# Patient Record
Sex: MALE | Race: WHITE | URBAN - METROPOLITAN AREA
[De-identification: names, ages, dates, MRNs, and addresses within clinical notes are randomized per-mention and may not be internally consistent; named-entity substitution may affect disease eponyms.]

---

## 2017-10-16 PROBLEM — M54.16 LEFT LUMBAR RADICULOPATHY: Status: ACTIVE | Noted: 2017-10-16

## 2023-10-01 ENCOUNTER — HOSPITAL ENCOUNTER (EMERGENCY)
Age: 53
Discharge: HOME | End: 2023-10-01
Attending: EMERGENCY MEDICINE

## 2023-10-01 VITALS
WEIGHT: 170 LBS | BODY MASS INDEX: 27 KG/M2 | DIASTOLIC BLOOD PRESSURE: 90 MMHG | OXYGEN SATURATION: 95 % | HEART RATE: 69 BPM | RESPIRATION RATE: 18 BRPM | TEMPERATURE: 98 F | SYSTOLIC BLOOD PRESSURE: 132 MMHG

## 2023-10-01 DIAGNOSIS — I10 HYPERTENSION, UNSPECIFIED TYPE: Primary | ICD-10-CM

## 2023-10-01 PROCEDURE — 99283 EMERGENCY DEPT VISIT LOW MDM: CPT

## 2023-10-01 PROCEDURE — 99282 EMERGENCY DEPT VISIT SF MDM: CPT

## 2023-10-01 RX ORDER — LOSARTAN POTASSIUM 25 MG/1
25 TABLET ORAL DAILY
COMMUNITY

## 2023-10-02 NOTE — DISCHARGE INSTRUCTIONS
Please start taking losartan 50 mg during the daytime if your blood pressure is still elevated in the evening greater than 140/90 you can take another dose of 25 mg at nighttime. Please follow-up with your primary care physician within 24 to 48 hours for close reevaluation. He can also follow-up with Dr. Jaida Birght who is a cardiologist, to discuss blood pressure management with him.

## 2023-10-02 NOTE — ED INITIAL ASSESSMENT (HPI)
Pt referred to the ER for HTN today, pt reports having a history of cancer surgery on his brain (August 10th at [facility]) and was told if his bp was elevated to come to the ER. He's scheduled to have another surgery for stent placement on October 12th at [facility].

## 2023-10-24 ENCOUNTER — HOSPITAL ENCOUNTER (OUTPATIENT)
Dept: CV DIAGNOSTICS | Age: 53
Discharge: HOME | End: 2023-10-24
Attending: INTERNAL MEDICINE

## 2023-10-24 DIAGNOSIS — R94.31 NONSPECIFIC ABNORMAL ELECTROCARDIOGRAM (ECG) (EKG): ICD-10-CM

## 2023-10-24 DIAGNOSIS — Z82.49 FAMILY HISTORY OF ISCHEMIC HEART DISEASE: ICD-10-CM

## 2023-10-24 PROCEDURE — 93306 TTE W/DOPPLER COMPLETE: CPT | Performed by: INTERNAL MEDICINE

## 2024-07-23 ENCOUNTER — OFFICE VISIT (OUTPATIENT)
Dept: FAMILY MEDICINE CLINIC | Facility: CLINIC | Age: 54
End: 2024-07-23
Payer: COMMERCIAL

## 2024-07-23 ENCOUNTER — PATIENT OUTREACH (OUTPATIENT)
Dept: CASE MANAGEMENT | Age: 54
End: 2024-07-23

## 2024-07-23 VITALS
RESPIRATION RATE: 16 BRPM | OXYGEN SATURATION: 98 % | HEIGHT: 69 IN | TEMPERATURE: 98 F | HEART RATE: 57 BPM | WEIGHT: 176.13 LBS | BODY MASS INDEX: 26.09 KG/M2 | DIASTOLIC BLOOD PRESSURE: 72 MMHG | SYSTOLIC BLOOD PRESSURE: 100 MMHG

## 2024-07-23 DIAGNOSIS — M25.561 PATELLAR TENDERNESS, RIGHT: ICD-10-CM

## 2024-07-23 DIAGNOSIS — Z00.00 WELL ADULT EXAM: Primary | ICD-10-CM

## 2024-07-23 DIAGNOSIS — D32.9 MENINGIOMA (HCC): ICD-10-CM

## 2024-07-23 DIAGNOSIS — Z12.11 COLON CANCER SCREENING: ICD-10-CM

## 2024-07-23 DIAGNOSIS — I67.1 CEREBRAL ANEURYSM, NONRUPTURED (HCC): ICD-10-CM

## 2024-07-23 DIAGNOSIS — M76.51 PATELLAR TENDINITIS OF RIGHT KNEE: ICD-10-CM

## 2024-07-23 DIAGNOSIS — Z12.5 SCREENING PSA (PROSTATE SPECIFIC ANTIGEN): ICD-10-CM

## 2024-07-23 DIAGNOSIS — I10 BENIGN ESSENTIAL HTN: ICD-10-CM

## 2024-07-23 PROBLEM — R73.03 PREDIABETES: Status: ACTIVE | Noted: 2023-08-11

## 2024-07-23 PROCEDURE — 99386 PREV VISIT NEW AGE 40-64: CPT | Performed by: FAMILY MEDICINE

## 2024-07-23 RX ORDER — METOPROLOL SUCCINATE 25 MG/1
25 TABLET, EXTENDED RELEASE ORAL DAILY
COMMUNITY

## 2024-07-23 RX ORDER — ASPIRIN 325 MG
325 TABLET ORAL DAILY
COMMUNITY

## 2024-07-23 RX ORDER — AMLODIPINE BESYLATE 5 MG/1
5 TABLET ORAL NIGHTLY
COMMUNITY

## 2024-07-23 RX ORDER — AZELASTINE 1 MG/ML
2 SPRAY, METERED NASAL 2 TIMES DAILY
COMMUNITY
Start: 2024-05-20

## 2024-07-23 NOTE — PROCEDURES
The office order to remove patient from the asthma registry is Approved and finalized on July 23, 2024.

## 2024-07-23 NOTE — PROGRESS NOTES
Note to patient: The 21st Century Cures Act makes medical notes like these available to patients in the interest of transparency. However, be advised this is a medical document. It is intended as peer to peer communication. It is written in medical language and may contain abbreviations or verbiage that are unfamiliar. It may appear blunt or direct. Medical documents are intended to carry relevant information, facts as evident, and the clinical opinion of the practitioner.         Chief Complaint   Patient presents with    Well Adult     Patient denies having asthma   Est with pcp        HPI:    54-year-old male who has a past medical history of meningioma s/p resection, intracranial aneurysms, hypertension presenting today for establishment of care.  He underwent surgical excision of the meningioma at [facility] in August 2023.  Incidentally during the surgery it was noted that he has a left carotid artery aneurysm.  He was placed on lisinopril 5 mg.  Patient unfortunately developed a cough with lisinopril so he was switched to Cozaar 25 mg.  In September 2023 he underwent a cerebral angiogram.  He was found to have additional aneurysms including the ophthalmic aneurysm.  He had this treated with pipeline embolization of the left ophthalmic artery aneurysm and left paraclinoid aneurysm he was placed on Plavix and aspirin for 3 months. His next f/u with neurosurgery is in November when he will have MRI and angiogram.     Colonoscopy was done in 2020 with Dr. Dunham's not due for another colonoscopy.  Patient had a polyp and will need to undergo colonoscopy again.  He was placed on a 3-year plan and now is overdue.         HTN- he is on amlodipine 5mg, losartan 25mg, metoprolol 25 mg, - plan to be on high dose asa for one year since surgery. Patient follows cardiologist. Recently metoprolol was reduced to 25mg per day from 50mg.     Patient reports today that he does not have asthma. Reports that  when he gets a cold he gets a cough. No smoking history. He does have grass/spring time allergies.       Patient reports he has patellar tendon pain. He has normal gait. Onset couple months ago. Pain is getting worsee. He walks a lot at work and that creates more pain.      Smoking: none  Alcohol: none    Drugs: none   Sexual hx: 1 partner   STD hx: declined  No fmhx of colon or prostate cancer.   Tdap: declined tdap     Review of Systems   Constitutional: Negative for fever, chills and fatigue. No distress.  HENT: Negative for hearing loss, congestion, sore throat, neck pain   Eyes: Negative for pain and visual disturbance.   Respiratory: Negative for cough, chest tightness, shortness of breath and wheezing.    Cardiovascular: Negative for chest pain, palpitations and leg swelling.   Gastrointestinal: Negative for nausea, vomiting, abdominal pain, diarrhea, blood in stool and abdominal distention.   Genitourinary: Negative for dysuria, hematuria and difficulty urinating.   Musculoskeletal: Negative for myalgias, back pain, joint swelling, arthralgias and gait problem.   Skin: Negative for color change and rash.   Neurological: Negative for dizziness, syncope, weakness, numbness, tingling and headaches.       Patient Active Problem List   Diagnosis    Atopic rhinitis    Dyslipidemia    Lumbar radiculopathy    Displacement of lumbar intervertebral disc without myelopathy    Herpes simplex virus (HSV) infection    Benign essential HTN    Cerebral aneurysm, nonruptured (HCC)    Meningioma (HCC)    Prediabetes       Past Medical History:    Asthma (HCC)     Past Surgical History:   Procedure Laterality Date    Colonoscopy  11/20/2020    [facility]    Hernia surgery       Family History   Problem Relation Age of Onset    Hypertension Mother     Cancer Father         Lung Ca     Social History     Socioeconomic History    Marital status:    Tobacco Use    Smoking status: Never     Passive exposure: Never     Smokeless tobacco: Never   Vaping Use    Vaping status: Never Used   Substance and Sexual Activity    Alcohol use: No     Alcohol/week: 0.0 standard drinks of alcohol    Drug use: Never     Social Determinants of Health      [facility]    Housing Stability       Current Outpatient Medications   Medication Sig Dispense Refill    aspirin 325 MG Oral Tab Take 1 tablet (325 mg total) by mouth daily.      azelastine 0.1 % Nasal Solution 2 sprays by Nasal route 2 (two) times daily.      amLODIPine 5 MG Oral Tab Take 1 tablet (5 mg total) by mouth nightly.      metoprolol succinate ER 25 MG Oral Tablet 24 Hr Take 1 tablet (25 mg total) by mouth daily.      losartan 25 MG Oral Tab Take 1 tablet (25 mg total) by mouth daily.         Allergies  Allergies   Allergen Reactions    Lisinopril Coughing       Health Maintenance  Immunizations:  Immunization History   Administered Date(s) Administered    Influenza 10/04/2013    Pneumovax 23 10/04/2013   Deferred Date(s) Deferred    Influenza Vaccine Refused 11/19/2019         Physical Exam  /72   Pulse 57   Temp 98 °F (36.7 °C) (Temporal)   Resp 16   Ht 5' 9\" (1.753 m)   Wt 176 lb 2.4 oz (79.9 kg)   SpO2 98%   BMI 26.01 kg/m²   Constitutional: Oriented to person, place, and time. No distress.   HEENT:  Normocephalic and atraumatic. Hearing and tympanic membranes normal.  Nose normal. Oropharynx is clear and moist.   Eyes: Conjunctivae and EOM are normal. PERRLA. No scleral icterus.   Neck:  No mass and no thyromegaly present.   Cardiovascular: Normal rate, regular rhythm and intact distal pulses.  No murmur, rubs or gallops.   Pulmonary/Chest: Effort normal and breath sounds normal. No respiratory distress. No wheezes, rhonchi or rales  Abdominal: Soft. Bowel sounds are normal. Non tender, no masses, no organomegaly or hernias.  Musculoskeletal: focal tenderness without swelling at right patellar  tendon, FROM of the right knee joint with normal flexion/extension, no medial joint line tenderness. Gait normal.     Neurological: grossly normal  Psychiatric: Normal mood and affect.     A/P:    Encounter Diagnoses   Name Primary?    Well adult exam Yes    Screening PSA (prostate specific antigen)     Colon cancer screening     Patellar tenderness, right     Patellar tendinitis of right knee     Benign essential HTN     Meningioma (HCC)     Cerebral aneurysm, nonruptured (HCC)      1. Well adult exam  - -Discussed diet and exercise, counseled on vaccine and screening guidelines.   - CBC With Differential With Platelet; Future  - Comp Metabolic Panel (14); Future  - Lipid Panel; Future    2. Screening PSA (prostate specific antigen)  - PSA Screen; Future    3. Colon cancer screening  - Gastro Referral - In Network    4. Patellar tenderness, right  Referred to sports medicine.     5. Patellar tendinitis of right knee  - Ortho Referral - In Network    6. Benign essential HTN  BP shows good control with last BP of 100/72. Continue lifestyle changes, diet, exercise and weight loss.   Last K was 4.59 done on 10/27/2020.  Last Cr was 0.53 done on 10/27/2020.  BP Meds: amLODIPine Tabs - 5 MG; losartan Tabs - 25 MG; metoprolol succinate ER Tb24 - 25 MG   - amLODIPine 5 MG Oral Tab; Take 1 tablet (5 mg total) by mouth nightly.  - metoprolol succinate ER 25 MG Oral Tablet 24 Hr; Take 1 tablet (25 mg total) by mouth daily.  Pt does report fatigue, may be due to beta blocker.   He follows with cardiology.   Advised to see cardiology or me every 6 months at least for bP check. Patient to monitor bp at home daily.     7. Meningioma (HCC)  S/p resection   He will be on high dose asa for total of one year since surgery for aneurysms.   Follows with neurosurgery- next appt in November       8. Cerebral aneurysm, nonruptured (HCC)  S/p pipeline embolization of left ophthalmic and left paraclinoid aneurysm       Orders Placed This  Encounter   Procedures    CBC With Differential With Platelet    Comp Metabolic Panel (14)    Lipid Panel    PSA Screen       Meds & Refills for this Visit:  Requested Prescriptions      No prescriptions requested or ordered in this encounter       Imaging & Consults:  GASTRO - INTERNAL  ORTHOPEDIC - INTERNAL  [facility] PCP OR REGISTRY REMOVAL REQUEST      Return in about 6 months (around 1/23/2025) for blood pressure.    There are no Patient Instructions on file for this visit.    All questions were answered and the patient understands the plan.     Traci Vogt,         This note was prepared using Dragon Medical voice recognition dictation software. As a result errors may occur. When identified these errors have been corrected. While every attempt is made to correct errors during dictation discrepancies may still exist.      Note to patient: The 21st Century Cures Act makes medical notes like these available to patients in the interest of transparency. However, be advised this is a medical document. It is intended as peer to peer communication. It is written in medical language and may contain abbreviations or verbiage that are unfamiliar. It may appear blunt or direct. Medical documents are intended to carry relevant information, facts as evident, and the clinical opinion of the practitioner.

## 2024-07-26 ENCOUNTER — LAB ENCOUNTER (OUTPATIENT)
Dept: LAB | Age: 54
End: 2024-07-26
Attending: FAMILY MEDICINE
Payer: COMMERCIAL

## 2024-07-26 DIAGNOSIS — Z12.5 SCREENING PSA (PROSTATE SPECIFIC ANTIGEN): ICD-10-CM

## 2024-07-26 DIAGNOSIS — Z00.00 WELL ADULT EXAM: ICD-10-CM

## 2024-07-26 LAB
ALBUMIN SERPL-MCNC: 4.4 G/DL (ref 3.2–4.8)
ALBUMIN/GLOB SERPL: 1.8 {RATIO} (ref 1–2)
ALP LIVER SERPL-CCNC: 106 U/L
ALT SERPL-CCNC: 24 U/L
ANION GAP SERPL CALC-SCNC: 4 MMOL/L (ref 0–18)
AST SERPL-CCNC: 29 U/L (ref ?–34)
BASOPHILS # BLD AUTO: 0.04 X10(3) UL (ref 0–0.2)
BASOPHILS NFR BLD AUTO: 0.8 %
BILIRUB SERPL-MCNC: 0.8 MG/DL (ref 0.3–1.2)
BUN BLD-MCNC: 13 MG/DL (ref 9–23)
CALCIUM BLD-MCNC: 9.3 MG/DL (ref 8.7–10.4)
CHLORIDE SERPL-SCNC: 104 MMOL/L (ref 98–112)
CHOLEST SERPL-MCNC: 185 MG/DL (ref ?–200)
CO2 SERPL-SCNC: 31 MMOL/L (ref 21–32)
COMPLEXED PSA SERPL-MCNC: 0.2 NG/ML (ref ?–4)
CREAT BLD-MCNC: 0.74 MG/DL
EGFRCR SERPLBLD CKD-EPI 2021: 108 ML/MIN/1.73M2 (ref 60–?)
EOSINOPHIL # BLD AUTO: 0.14 X10(3) UL (ref 0–0.7)
EOSINOPHIL NFR BLD AUTO: 2.9 %
ERYTHROCYTE [DISTWIDTH] IN BLOOD BY AUTOMATED COUNT: 13.7 %
FASTING PATIENT LIPID ANSWER: YES
FASTING STATUS PATIENT QL REPORTED: YES
GLOBULIN PLAS-MCNC: 2.5 G/DL (ref 2–3.5)
GLUCOSE BLD-MCNC: 97 MG/DL (ref 70–99)
HCT VFR BLD AUTO: 43 %
HDLC SERPL-MCNC: 40 MG/DL (ref 40–59)
HGB BLD-MCNC: 14.3 G/DL
IMM GRANULOCYTES # BLD AUTO: 0.02 X10(3) UL (ref 0–1)
IMM GRANULOCYTES NFR BLD: 0.4 %
LDLC SERPL CALC-MCNC: 122 MG/DL (ref ?–100)
LYMPHOCYTES # BLD AUTO: 1.46 X10(3) UL (ref 1–4)
LYMPHOCYTES NFR BLD AUTO: 30.2 %
MCH RBC QN AUTO: 30.8 PG (ref 26–34)
MCHC RBC AUTO-ENTMCNC: 33.3 G/DL (ref 31–37)
MCV RBC AUTO: 92.7 FL
MONOCYTES # BLD AUTO: 0.4 X10(3) UL (ref 0.1–1)
MONOCYTES NFR BLD AUTO: 8.3 %
NEUTROPHILS # BLD AUTO: 2.77 X10 (3) UL (ref 1.5–7.7)
NEUTROPHILS # BLD AUTO: 2.77 X10(3) UL (ref 1.5–7.7)
NEUTROPHILS NFR BLD AUTO: 57.4 %
NONHDLC SERPL-MCNC: 145 MG/DL (ref ?–130)
OSMOLALITY SERPL CALC.SUM OF ELEC: 288 MOSM/KG (ref 275–295)
PLATELET # BLD AUTO: 220 10(3)UL (ref 150–450)
POTASSIUM SERPL-SCNC: 4 MMOL/L (ref 3.5–5.1)
PROT SERPL-MCNC: 6.9 G/DL (ref 5.7–8.2)
RBC # BLD AUTO: 4.64 X10(6)UL
SODIUM SERPL-SCNC: 139 MMOL/L (ref 136–145)
TRIGL SERPL-MCNC: 129 MG/DL (ref 30–149)
VLDLC SERPL CALC-MCNC: 23 MG/DL (ref 0–30)
WBC # BLD AUTO: 4.8 X10(3) UL (ref 4–11)

## 2024-07-26 PROCEDURE — 80053 COMPREHEN METABOLIC PANEL: CPT

## 2024-07-26 PROCEDURE — 80061 LIPID PANEL: CPT

## 2024-07-26 PROCEDURE — 85025 COMPLETE CBC W/AUTO DIFF WBC: CPT

## 2024-07-26 PROCEDURE — 36415 COLL VENOUS BLD VENIPUNCTURE: CPT

## 2024-07-31 ENCOUNTER — TELEPHONE (OUTPATIENT)
Dept: FAMILY MEDICINE CLINIC | Facility: CLINIC | Age: 54
End: 2024-07-31

## 2024-07-31 DIAGNOSIS — Z12.11 COLON CANCER SCREENING: Primary | ICD-10-CM

## 2024-07-31 NOTE — TELEPHONE ENCOUNTER
Spouse requesting another GI referral, will like to see new specialist. Will like to see GI in the [place] area, looking for PCP recommendation

## 2024-08-20 ENCOUNTER — TELEPHONE (OUTPATIENT)
Dept: FAMILY MEDICINE CLINIC | Facility: CLINIC | Age: 54
End: 2024-08-20

## 2024-08-20 ENCOUNTER — TELEPHONE (OUTPATIENT)
Dept: ORTHOPEDICS CLINIC | Facility: CLINIC | Age: 54
End: 2024-08-20

## 2024-08-20 DIAGNOSIS — M25.561 RIGHT KNEE PAIN, UNSPECIFIED CHRONICITY: Primary | ICD-10-CM

## 2024-08-20 NOTE — TELEPHONE ENCOUNTER
Spoke to patient's wife, ok per VRI form. Reviewed results and recommendations. Advised wife to keep upcoming ortho appointment and check to see if there are any cancellations. Wife verbalizes understanding.    Future Appointments   Date Time Provider Department Center   10/7/2024  9:00 AM Robb Mendez MD [facility] [address]   10/7/2024 10:30 AM Raheem Young,  [facility]

## 2024-08-20 NOTE — TELEPHONE ENCOUNTER
Patient's wife is calling to request a call back for lab results.  Also patient states he discussed R knee and would like a referral to see Orthopedic.    Please call with test results.Ph. [phone]

## 2024-08-20 NOTE — TELEPHONE ENCOUNTER
Patient is scheduled for right knee pain. Please advise if imaging is needed.  Future Appointments   Date Time Provider Department Center   10/7/2024  9:00 AM Robb Mendez MD [facility] [address]   10/7/2024 10:30 AM Raheem Young, DO [facility] SUB GI

## 2024-10-14 ENCOUNTER — HOSPITAL ENCOUNTER (OUTPATIENT)
Dept: GENERAL RADIOLOGY | Age: 54
Discharge: HOME | End: 2024-10-14
Attending: ORTHOPAEDIC SURGERY
Payer: COMMERCIAL

## 2024-10-14 ENCOUNTER — OFFICE VISIT (OUTPATIENT)
Facility: CLINIC | Age: 54
End: 2024-10-14
Payer: COMMERCIAL

## 2024-10-14 VITALS — WEIGHT: 175.81 LBS | HEIGHT: 69 IN | BODY MASS INDEX: 26.04 KG/M2

## 2024-10-14 DIAGNOSIS — M25.561 RIGHT KNEE PAIN, UNSPECIFIED CHRONICITY: Primary | ICD-10-CM

## 2024-10-14 DIAGNOSIS — M25.561 RIGHT KNEE PAIN, UNSPECIFIED CHRONICITY: ICD-10-CM

## 2024-10-14 PROCEDURE — 99203 OFFICE O/P NEW LOW 30 MIN: CPT | Performed by: ORTHOPAEDIC SURGERY

## 2024-10-14 PROCEDURE — 73564 X-RAY EXAM KNEE 4 OR MORE: CPT | Performed by: ORTHOPAEDIC SURGERY

## 2024-10-14 NOTE — H&P
[facility] New Patient Note    CC:   Chief Complaint   Patient presents with    Knee Pain     Right knee pain        HPI: This 54 year old male presents today with complaints of right knee pain.  He reports this has been going on for about 5 months.  Symptoms began atraumatically.  Pain is on the medial aspect of the knee joint, points anteromedial.  He denies any injury, no prior knee pain.  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  He does not take any medications for it.  He did do physical therapy and this did not help.  He notes that more recently he has applied a \"natural\" over-the-counter patch over the knee, and has gotten 50% better over the past few days.  No radiation of pain.    Past Medical History:    Asthma (HCC)     Past Surgical History:   Procedure Laterality Date    Colonoscopy  11/20/2020    [facility]ll[facility]    Hernia surgery       Current Outpatient Medications   Medication Sig Dispense Refill    aspirin 325 MG Oral Tab Take 1 tablet (325 mg total) by mouth daily.      azelastine 0.1 % Nasal Solution 2 sprays by Nasal route 2 (two) times daily.      amLODIPine 5 MG Oral Tab Take 1 tablet (5 mg total) by mouth nightly.      metoprolol succinate ER 25 MG Oral Tablet 24 Hr Take 1 tablet (25 mg total) by mouth daily.      losartan 25 MG Oral Tab Take 1 tablet (25 mg total) by mouth daily.      PEG 3350-KCl-Na Bicarb-NaCl 420 g Oral Recon Soln Take as directed by physician (Patient not taking: Reported on 10/14/2024) 4000 mL 0     Allergies[1]  Family History   Problem Relation Age of Onset    Hypertension Mother     Cancer Father         Lung Ca     Social History     Occupational History    Not on file   Tobacco Use    Smoking status: Never     Passive exposure: Never    Smokeless tobacco: Never    Tobacco comments:     Updated 10/14/24   Vaping Use    Vaping status: Never Used   Substance and  Sexual Activity    Alcohol use: No     Alcohol/week: 0.0 standard drinks of alcohol    Drug use: Never    Sexual activity: Not on file        ROS:  Detailed system review obtained and negative except as mentioned above      Physical Exam:    Ht 5' 9\" (1.753 m)   Wt 175 lb 12.8 oz (79.7 kg)   BMI 25.96 kg/m²   Constitutional: Awake, alert, no distress.  Present with spouse  Psychological: Appropriate affect.  Respiratory: Unlabored breathing.  Right lower extremity:  Inspection: skin intact, no lesions, no effusion about the knee  Palpation: Tender to palpation about the anteromedial joint line, reproduces pain complaint  Range of motion: Full extension, flexion past 120  Knee stable to varus and valgus stress throughout range of motion, negative Suzie's  Neuromuscular: 5 out of 5 quad strength, sensation intact  Vascular: Warm well-perfused      Imaging: Weightbearing x-rays of the right knee personally viewed, independently interpreted and radiology report read.  Mild degenerative changes, possible squaring of the medial femoral condyle and possible osteophytic lipping, joint space largely maintained.  Lateral view with probable medial joint space narrowing.      Assessment/Plan:  Diagnoses and all orders for this visit:    Right knee pain, unspecified chronicity      Assessment: 54-year-old male with subacute atraumatic right knee pain, mild osteoarthritis    Plan: We discussed potential etiologies of his symptoms, including mild osteoarthritis, soft tissue/internal derangement.  Discussed treatment options going forward.  He has done physical therapy, this did not provide relief.  He is using an over-the-counter patch that he states has helped tremendously and he would like to continue this for now.  I did  on anti-inflammatory use, but he does have some medical issues that may contraindicate this.  He is additionally taking aspirin at this time.  Discussed possibility of steroid injection, he would  like to hold off and see how his symptoms progress with these over-the-counter patches, and if he does want to proceed with injection he can contact us.  I did provide contact info/card for Dr. Eli given that he is a mildly arthritic case at worst, surgery would not be considered at this time.    Robb Mendez MD, FAAOS  [facility]  Phone [phone]  Fax [phone]         [1]   Allergies  Allergen Reactions    Lisinopril Coughing

## 2024-10-15 ENCOUNTER — OFFICE VISIT (OUTPATIENT)
Dept: FAMILY MEDICINE CLINIC | Facility: CLINIC | Age: 54
End: 2024-10-15
Payer: COMMERCIAL

## 2024-10-15 VITALS
OXYGEN SATURATION: 92 % | SYSTOLIC BLOOD PRESSURE: 98 MMHG | DIASTOLIC BLOOD PRESSURE: 60 MMHG | RESPIRATION RATE: 16 BRPM | HEART RATE: 64 BPM | WEIGHT: 179 LBS | BODY MASS INDEX: 26.51 KG/M2 | HEIGHT: 69 IN | TEMPERATURE: 97 F

## 2024-10-15 DIAGNOSIS — Z28.21 REFUSED INFLUENZA VACCINE: ICD-10-CM

## 2024-10-15 DIAGNOSIS — Z12.11 COLON CANCER SCREENING: ICD-10-CM

## 2024-10-15 DIAGNOSIS — Z01.818 PREOP EXAMINATION: Primary | ICD-10-CM

## 2024-10-15 LAB
ATRIAL RATE: 58 BPM
P AXIS: 13 DEGREES
P-R INTERVAL: 162 MS
Q-T INTERVAL: 422 MS
QRS DURATION: 80 MS
QTC CALCULATION (BEZET): 414 MS
R AXIS: -21 DEGREES
T AXIS: 8 DEGREES
VENTRICULAR RATE: 58 BPM

## 2024-10-15 PROCEDURE — 99213 OFFICE O/P EST LOW 20 MIN: CPT | Performed by: FAMILY MEDICINE

## 2024-10-15 PROCEDURE — 93000 ELECTROCARDIOGRAM COMPLETE: CPT | Performed by: FAMILY MEDICINE

## 2024-10-15 NOTE — PROGRESS NOTES
Sukhdeep Morris is a 54 year old male.   Chief Complaint   Patient presents with    Pre-Op Exam     ANGIO CAROTID CEREBRAL KAVON S&I  10/22/2024 at [facility]  Dr Banuelos  Did not receive pre op form      HPI:   Sukhedep presents for preoperative evaluation and clearance for Diagnostic Cerebral Angiogram on 10/22/2024 with Dr. Banuelos.    Per preop note from surgeons office said to continue asa 325mg prior to surgery.   PMHx: meningioma s/p resection, intracranial aneurysms, hypertension  Patient is a nonsmoker.   Denies any cardiac symptoms.   No h/o of ischemic heart disease/CHF  No hx of CVD, diabetes, blood thinner use  No personal or fmhx of hypercoagulability.   No hx of adverse reaction to anesthesia.   No hx of anemia.   No hx of DVT/PE  Patient instructed to avoid/hold NSAIDs,  vitamins & supplements 7 days prior to surgery.         Current Outpatient Medications   Medication Sig Dispense Refill    PEG 3350-KCl-Na Bicarb-NaCl 420 g Oral Recon Soln Take as directed by physician 4000 mL 0    aspirin 325 MG Oral Tab Take 1 tablet (325 mg total) by mouth daily.      azelastine 0.1 % Nasal Solution 2 sprays by Nasal route 2 (two) times daily.      amLODIPine 5 MG Oral Tab Take 1 tablet (5 mg total) by mouth nightly.      metoprolol succinate ER 25 MG Oral Tablet 24 Hr Take 1 tablet (25 mg total) by mouth daily.      losartan 25 MG Oral Tab Take 1 tablet (25 mg total) by mouth daily.        Allergies[1]   Past Medical History:    Asthma (HCC)      Past Surgical History:   Procedure Laterality Date    Colonoscopy  11/20/2020    [place] Dr. Mcclellan    Hernia surgery        Family History   Problem Relation Age of Onset    Hypertension Mother     Cancer Father         Lung Ca      Social History     Socioeconomic History    Marital status:    Tobacco Use    Smoking status: Never     Passive exposure: Never    Smokeless tobacco: Never    Tobacco comments:     Updated 10/14/24   Vaping Use    Vaping status: Never  Used   Substance and Sexual Activity    Alcohol use: No     Alcohol/week: 0.0 standard drinks of alcohol    Drug use: Never        REVIEW OF SYSTEMS:   GENERAL HEALTH: feels well otherwise, denies fever  EYES: no visual complaints or deficits  HEENT: denies nasal congestion, sinus pain or sore throat; hearing loss negative  RESPIRATORY: denies shortness of breath, wheezing or cough  CARDIOVASCULAR: denies chest pain or HERNANDEZ; no palpitations  GI: denies nausea, vomiting, constipation, diarrhea; no rectal bleeding; no heartburn  GENITAL/: no dysuria, urgency or frequency  MUSCULOSKELETAL: no joint complaints upper or lower extremities  NEURO: no sensory or motor complaint  HEMATOLOGY: denies h/o anemia  Immunization History   Administered Date(s) Administered    Influenza 10/04/2013    Pneumovax 23 10/04/2013   Deferred Date(s) Deferred    Influenza Vaccine Refused 11/19/2019    Influenza Vaccine, trivalent (IIV3), PF 0.5mL (61644) 10/15/2024       EXAM:   BP 98/60   Pulse 64   Temp 97.3 °F (36.3 °C) (Temporal)   Resp 16   Ht 5' 9\" (1.753 m)   Wt 179 lb (81.2 kg)   SpO2 92%   BMI 26.43 kg/m²   GENERAL: well developed, well nourished, in no apparent distress  HEENT: normocephalic; normal nose, pharynx and TM's  EYES: PERRLA  NECK: supple, no thyromegaly  RESPIRATORY: clear to percussion and auscultation  CARDIOVASCULAR: S1, S2 normal, RRR; no S3, no S4; no click; murmur negative  ABDOMEN: normal active BS+, soft, nondistended; no HSM; no masses; no bruits; no masses; nontender  EXTREMITIES: no cyanosis, clubbing or edema, peripheral pulses intact  NEUROLOGIC: grossly normal, normal gait   PSYCHIATRIC: alert and oriented x 3; affect appropriate    EKG    Rate, intervals and axes as noted on EKG Report.  Rate: 58  Rhythm: Sinus Rhythm  Reading: NSR no acute st or t wave changes.       ASSESSMENT & PLAN:   Preoperative Physical Exam:     Sukhdeep was examined today and is an acceptable risk to proceed with surgery  following review of his labs.   EKG completed in the office today.    Will obtain and evaluate preoperative labs   Risks and benefits of surgery explained to patient.  Including;  Deep venous thrombosis, pulmonary embolus, bleeding, myocardial infarction, failure to relieve pain, wound infection, wound dehiscence, anesthesia complications, arrhythmia's etc.    ADDENDUM 10/19/24:  LABS REVIEWED. ALL LABS ARE STABLE/NORMAL. PATIENT IS ACCEPTABLE RISK TO PROCEED WITH PROCEDURE.     Traci Vogt,         This note was prepared using Dragon Medical voice recognition dictation software. As a result errors may occur. When identified these errors have been corrected. While every attempt is made to correct errors during dictation discrepancies may still exist.      Note to patient: The 21st Century Cures Act makes medical notes like these available to patients in the interest of transparency. However, be advised this is a medical document. It is intended as peer to peer communication. It is written in medical language and may contain abbreviations or verbiage that are unfamiliar. It may appear blunt or direct. Medical documents are intended to carry relevant information, facts as evident, and the clinical opinion of the practitioner.            id#[id number]         [1]   Allergies  Allergen Reactions    Lisinopril Coughing

## 2024-10-17 ENCOUNTER — TELEPHONE (OUTPATIENT)
Dept: FAMILY MEDICINE CLINIC | Facility: CLINIC | Age: 54
End: 2024-10-17

## 2024-10-17 NOTE — TELEPHONE ENCOUNTER
EKG done on 10/15/2024  Patient did not complete labs ordered on 10/15/2024.  Called Quinn to confirm which labs are needed.  No letter from surgeon's office with pre-op requirements.    Once labs are confirmed, will notify patient to complete labs.

## 2024-10-17 NOTE — TELEPHONE ENCOUNTER
Quinn from [facility] called and said patient is having surgery on 10/22/24 and they are still in need of lab results and the EKG tracing.  She would like it faxed to [phone].

## 2024-10-18 ENCOUNTER — LAB ENCOUNTER (OUTPATIENT)
Dept: LAB | Facility: HOSPITAL | Age: 54
End: 2024-10-18
Attending: FAMILY MEDICINE
Payer: COMMERCIAL

## 2024-10-18 DIAGNOSIS — Z01.818 PREOP EXAMINATION: ICD-10-CM

## 2024-10-18 LAB
ALBUMIN SERPL-MCNC: 4.3 G/DL (ref 3.2–4.8)
ALBUMIN/GLOB SERPL: 1.8 {RATIO} (ref 1–2)
ALP LIVER SERPL-CCNC: 111 U/L
ALT SERPL-CCNC: 14 U/L
ANION GAP SERPL CALC-SCNC: 9 MMOL/L (ref 0–18)
APTT PPP: 29.9 SECONDS (ref 23–36)
AST SERPL-CCNC: 17 U/L (ref ?–34)
BASOPHILS # BLD AUTO: 0.02 X10(3) UL (ref 0–0.2)
BASOPHILS NFR BLD AUTO: 0.4 %
BILIRUB SERPL-MCNC: 0.6 MG/DL (ref 0.3–1.2)
BUN BLD-MCNC: 19 MG/DL (ref 9–23)
CALCIUM BLD-MCNC: 9.4 MG/DL (ref 8.7–10.4)
CHLORIDE SERPL-SCNC: 104 MMOL/L (ref 98–112)
CO2 SERPL-SCNC: 27 MMOL/L (ref 21–32)
CREAT BLD-MCNC: 0.63 MG/DL
EGFRCR SERPLBLD CKD-EPI 2021: 113 ML/MIN/1.73M2 (ref 60–?)
EOSINOPHIL # BLD AUTO: 0.2 X10(3) UL (ref 0–0.7)
EOSINOPHIL NFR BLD AUTO: 3.8 %
ERYTHROCYTE [DISTWIDTH] IN BLOOD BY AUTOMATED COUNT: 13.2 %
FASTING STATUS PATIENT QL REPORTED: NO
GLOBULIN PLAS-MCNC: 2.4 G/DL (ref 2–3.5)
GLUCOSE BLD-MCNC: 95 MG/DL (ref 70–99)
HCT VFR BLD AUTO: 39.7 %
HGB BLD-MCNC: 13.6 G/DL
IMM GRANULOCYTES # BLD AUTO: 0 X10(3) UL (ref 0–1)
IMM GRANULOCYTES NFR BLD: 0 %
INR BLD: 0.94 (ref 0.8–1.2)
LYMPHOCYTES # BLD AUTO: 1.58 X10(3) UL (ref 1–4)
LYMPHOCYTES NFR BLD AUTO: 29.7 %
MCH RBC QN AUTO: 31.3 PG (ref 26–34)
MCHC RBC AUTO-ENTMCNC: 34.3 G/DL (ref 31–37)
MCV RBC AUTO: 91.3 FL
MONOCYTES # BLD AUTO: 0.54 X10(3) UL (ref 0.1–1)
MONOCYTES NFR BLD AUTO: 10.2 %
NEUTROPHILS # BLD AUTO: 2.98 X10 (3) UL (ref 1.5–7.7)
NEUTROPHILS # BLD AUTO: 2.98 X10(3) UL (ref 1.5–7.7)
NEUTROPHILS NFR BLD AUTO: 55.9 %
OSMOLALITY SERPL CALC.SUM OF ELEC: 292 MOSM/KG (ref 275–295)
PLATELET # BLD AUTO: 228 10(3)UL (ref 150–450)
POTASSIUM SERPL-SCNC: 4.1 MMOL/L (ref 3.5–5.1)
PROT SERPL-MCNC: 6.7 G/DL (ref 5.7–8.2)
PROTHROMBIN TIME: 12.7 SECONDS (ref 11.6–14.8)
RBC # BLD AUTO: 4.35 X10(6)UL
SODIUM SERPL-SCNC: 140 MMOL/L (ref 136–145)
TSI SER-ACNC: 0.58 MIU/ML (ref 0.55–4.78)
WBC # BLD AUTO: 5.3 X10(3) UL (ref 4–11)

## 2024-10-18 PROCEDURE — 85025 COMPLETE CBC W/AUTO DIFF WBC: CPT

## 2024-10-18 PROCEDURE — 85730 THROMBOPLASTIN TIME PARTIAL: CPT

## 2024-10-18 PROCEDURE — 80053 COMPREHEN METABOLIC PANEL: CPT

## 2024-10-18 PROCEDURE — 85610 PROTHROMBIN TIME: CPT

## 2024-10-18 PROCEDURE — 84443 ASSAY THYROID STIM HORMONE: CPT

## 2024-10-18 PROCEDURE — 36415 COLL VENOUS BLD VENIPUNCTURE: CPT

## 2024-10-18 NOTE — TELEPHONE ENCOUNTER
Called [facility] to confirm lab orders. No answer.    Called patient. Spoke to patient and wife. Patient did lab work at a [place] facility. Labs drawn are unknown. Patient on his way to [facility] appointment. Will complete lab work after appointment.     Patient requesting a call back or MCM after provider reviews labs and documents faxed to [facility].

## 2024-10-21 NOTE — TELEPHONE ENCOUNTER
Faxed office visit notes from 10/15/24, Labs from 10/18/24, and EKG from 10/15/24 to Dr. Banuelos at [facility] at [phone]. Fax confirmed

## 2025-04-05 ENCOUNTER — OFFICE VISIT (OUTPATIENT)
Dept: FAMILY MEDICINE CLINIC | Facility: CLINIC | Age: 55
End: 2025-04-05
Payer: COMMERCIAL

## 2025-04-05 VITALS
TEMPERATURE: 99 F | HEART RATE: 75 BPM | WEIGHT: 175.19 LBS | OXYGEN SATURATION: 96 % | DIASTOLIC BLOOD PRESSURE: 80 MMHG | BODY MASS INDEX: 26 KG/M2 | SYSTOLIC BLOOD PRESSURE: 112 MMHG | RESPIRATION RATE: 16 BRPM

## 2025-04-05 DIAGNOSIS — B34.9 ACUTE VIRAL SYNDROME: Primary | ICD-10-CM

## 2025-04-05 PROCEDURE — 99213 OFFICE O/P EST LOW 20 MIN: CPT | Performed by: PHYSICIAN ASSISTANT

## 2025-04-05 PROCEDURE — 87637 SARSCOV2&INF A&B&RSV AMP PRB: CPT | Performed by: PHYSICIAN ASSISTANT

## 2025-04-05 RX ORDER — BENZONATATE 200 MG/1
200 CAPSULE ORAL 3 TIMES DAILY PRN
Qty: 21 CAPSULE | Refills: 0 | Status: SHIPPED | OUTPATIENT
Start: 2025-04-05 | End: 2025-04-12

## 2025-04-05 RX ORDER — OSELTAMIVIR PHOSPHATE 75 MG/1
75 CAPSULE ORAL 2 TIMES DAILY
Qty: 10 CAPSULE | Refills: 0 | Status: SHIPPED | OUTPATIENT
Start: 2025-04-05 | End: 2025-04-10

## 2025-04-05 NOTE — PATIENT INSTRUCTIONS
Tamiflu  OTC symptoms support  Fluids/rest  Follow up with PCP   If worsening symptoms seek treatment

## 2025-04-05 NOTE — PROGRESS NOTES
CHIEF COMPLAINT:     Chief Complaint   Patient presents with    Cough     Dry cough, temp was not check, s/s for 3 days.  OTC meds taken       HPI:   Sukhdeep Morris is a 55 year old male who presents with complaints of feeling sick for 3 days.  Symptoms include fever, nasal congestion, nasal drainage, and cough.  The patient did not take his temperature with a thermometer.  He denies taking any antipyretics today.  The patient denies ear pain, sore throat, CP, SOB, wheezing, abdominal pain, vomiting, diarrhea, rash, and urinary symptoms.  The patient reports his mother is sick with similar symptoms.  He denies any history of COVID.  He denies receiving a flu shot this year.     Current Outpatient Medications   Medication Sig Dispense Refill    oseltamivir (TAMIFLU) 75 MG Oral Cap Take 1 capsule (75 mg total) by mouth 2 (two) times daily for 5 days. 10 capsule 0    benzonatate 200 MG Oral Cap Take 1 capsule (200 mg total) by mouth 3 (three) times daily as needed for cough. 21 capsule 0    PEG 3350-KCl-Na Bicarb-NaCl 420 g Oral Recon Soln Take as directed by physician 4000 mL 0    aspirin 325 MG Oral Tab Take 1 tablet (325 mg total) by mouth daily.      azelastine 0.1 % Nasal Solution 2 sprays by Nasal route 2 (two) times daily.      amLODIPine 5 MG Oral Tab Take 1 tablet (5 mg total) by mouth nightly.      metoprolol succinate ER 25 MG Oral Tablet 24 Hr Take 1 tablet (25 mg total) by mouth daily.      losartan 25 MG Oral Tab Take 1 tablet (25 mg total) by mouth daily.        Past Medical History:    Asthma (HCC)      Social History:  Social History     Socioeconomic History    Marital status:    Tobacco Use    Smoking status: Never     Passive exposure: Never    Smokeless tobacco: Never    Tobacco comments:     Updated 10/14/24   Vaping Use    Vaping status: Never Used   Substance and Sexual Activity    Alcohol use: No     Alcohol/week: 0.0 standard drinks of alcohol    Drug use: Never     Social Drivers of  Health      [facility]    Housing Stability        REVIEW OF SYSTEMS:   GENERAL: See HPI  SKIN: Denies rashes, skin wounds or ulcers.  EYES: Denies blurred vision or double vision  HENT: See HPI  CHEST: Denies chest pain, or palpitations  LUNGS: See HPI  GI: Denies abdominal pain, N/V/C/D.   MUSCULOSKELETAL: no arthralgia or swollen joints  LYMPH:  Denies lymphadenopathy  NEURO: Denies headaches or lightheadedness      EXAM:   /80   Pulse 75   Temp 99.3 °F (37.4 °C) (Oral)   Resp 16   Wt 175 lb 3.2 oz (79.5 kg)   SpO2 96%   BMI 25.87 kg/m²   GENERAL: well developed, well nourished,in no apparent distress, cooperative   SKIN: no rashes, nosuspicious lesions, no abnormal pigmentation  HEAD: atraumatic, normocephalic  EYES: EOM intact, PERRL.  Conjunctiva normal.  Cornea clear.  Lid margins normal.  No active drainage.  EARS: Right TM normal, no bulging, no retraction, no fluid, bony landmarks normal.  Left TM normal, no bulging, no retraction, no fluid, bony landmarks normal.    NOSE: nostrils patent, + discharge, nasal mucosa pink and not inflamed.  No sinus tenderness.  THROAT: oral mucosa pink, moist and intact. No visible dental caries. Posterior pharynx without erythema or exudates.  NECK: supple, non-tender.  LUNGS: clear to auscultation bilaterally, no wheezes or rhonchi. Breathing is non labored.  CARDIO: RRR without murmur  GI: No visible scars, or masses. BS's present x4. No palpable masses or hepatosplenomegaly.  Non tender.  No guarding or rebound tenderness  EXTREMITIES: no cyanosis, clubbing or edema.  Homans NEG.  Dorsalis Pedis 2+.  LYMPH:  No lymphadenopathy.    NEURO: A&Ox3.  CN II-XII intact.  No focal deficits.  Coordination and Gait normal.  Kernig and Brudzinski's are negative.      ASSESSMENT AND PLAN:     ASSESSMENT:  Encounter Diagnosis   Name Primary?    Acute viral syndrome Yes       PLAN:    Patient Instructions    Tamiflu  OTC symptoms support  Fluids/rest  Follow up with PCP   If worsening symptoms seek treatment

## 2025-04-07 LAB
FLUAV + FLUBV RNA SPEC NAA+PROBE: DETECTED
FLUAV + FLUBV RNA SPEC NAA+PROBE: NOT DETECTED
RSV RNA SPEC NAA+PROBE: NOT DETECTED
SARS-COV-2 RNA RESP QL NAA+PROBE: NOT DETECTED

## 2025-05-01 ENCOUNTER — OFFICE VISIT (OUTPATIENT)
Dept: FAMILY MEDICINE CLINIC | Facility: CLINIC | Age: 55
End: 2025-05-01
Payer: COMMERCIAL

## 2025-05-01 ENCOUNTER — TELEPHONE (OUTPATIENT)
Dept: FAMILY MEDICINE CLINIC | Facility: CLINIC | Age: 55
End: 2025-05-01

## 2025-05-01 VITALS
BODY MASS INDEX: 26.22 KG/M2 | HEART RATE: 66 BPM | HEIGHT: 69 IN | WEIGHT: 177 LBS | RESPIRATION RATE: 16 BRPM | DIASTOLIC BLOOD PRESSURE: 72 MMHG | OXYGEN SATURATION: 97 % | SYSTOLIC BLOOD PRESSURE: 102 MMHG | TEMPERATURE: 97 F

## 2025-05-01 DIAGNOSIS — J40 BRONCHITIS: Primary | ICD-10-CM

## 2025-05-01 DIAGNOSIS — Z91.09 ENVIRONMENTAL ALLERGIES: ICD-10-CM

## 2025-05-01 PROCEDURE — 99213 OFFICE O/P EST LOW 20 MIN: CPT | Performed by: FAMILY MEDICINE

## 2025-05-01 RX ORDER — FLUTICASONE PROPIONATE 50 MCG
2 SPRAY, SUSPENSION (ML) NASAL DAILY
Qty: 11.1 ML | Refills: 0 | Status: SHIPPED | OUTPATIENT
Start: 2025-05-01 | End: 2025-05-31

## 2025-05-01 RX ORDER — MONTELUKAST SODIUM 10 MG/1
10 TABLET ORAL DAILY
Qty: 30 TABLET | Refills: 0 | Status: SHIPPED | OUTPATIENT
Start: 2025-05-01 | End: 2025-05-31

## 2025-05-01 RX ORDER — METHYLPREDNISOLONE 4 MG/1
TABLET ORAL
Qty: 21 EACH | Refills: 0 | Status: SHIPPED | OUTPATIENT
Start: 2025-05-01

## 2025-05-01 NOTE — TELEPHONE ENCOUNTER
Spouse calling, patient went to [facility] on 4-5 for cough. Spouse states patient is still sick and had possible fever. Patient took OTC Tylenol, spouse concerned due to patient's history of cerebral aneurysm. Please advise

## 2025-05-01 NOTE — TELEPHONE ENCOUNTER
Future Appointments   Date Time Provider Department Center   5/1/2025  1:30 PM Traci Vogt,  [facility] [address] [facility] [address]

## 2025-05-01 NOTE — PROGRESS NOTES
CHIEF COMPLAINT:     Chief Complaint   Patient presents with    Cough     Dry cough x 6 wks // had chills last night and fever... did not take temp        HPI:   Sukhdeep Morris is a 55 year old male who presents for upper respiratory symptoms for  6 weeks.   Pt has hx of meningioma s/p surgery.   Hx brian cerebral aneurysm s/p embolization.   Pt was seen on 4/5/25 for 3 days of URI at . He had fever, congestion, cough, nasal driange.   He took tamiflu, tested positive for influenza A.     Says tamiflu caused him to have high BP.   Today he reports dry cough since getting sick a month ago   Last night he felt chills. But he overall feels better.   Cough is worse at night. .   No hx of asthma but was told he might have asthma. He used albuterol before.   He never smoked.   Denies chest pain, sob, ear pain  Used ot have GERD. He stopped soda and GERD resolved.   He does have seasonal allergies. He did not try allergy meds yet.   Reports some itchy eyes.   No SOB, sputum production, fever    Current Medications[1]   Past Medical History[2]   Past Surgical History[3]   Family History[4]   Short Social Hx on File[5]      REVIEW OF SYSTEMS:   GENERAL: normal appetite  SKIN: no rashes or abnormal skin lesions  HEENT: See HPI  LUNGS: See HPI  CARDIOVASCULAR: denies chest pain or palpitations   GI: denies N/V/C or abdominal pain      EXAM:   /72   Pulse 66   Temp 97.2 °F (36.2 °C) (Temporal)   Resp 16   Ht 5' 9\" (1.753 m)   Wt 177 lb (80.3 kg)   SpO2 97%   BMI 26.14 kg/m²   GENERAL: well developed, well nourished,in no apparent distress  SKIN: no rashes,no suspicious lesions  HEAD: atraumatic, normocephalic.    EYES: conjunctiva clear, EOM intact  EARS: TM's normal, no bulging, noretraction,no fluid  NOSE: Nostrils patent, no nasal discharge, nasal mucosa normal    THROAT: Oral mucosa pink, moist. Posterior pharynx is not erythematous. no exudates. Tonsils normal   NECK: Supple, non-tender  LUNGS: clear to  auscultation bilaterally, no wheezes or rhonchi. Breathing is non labored.  CARDIO: RRR without murmur  EXTREMITIES: no cyanosis, clubbing or edema  LYMPH:  no cervical lymphadenopathy.      Lab review  No results found for this or any previous visit (from the past 24 hours).    ASSESSMENT AND PLAN:   Sukhdeep Morris is a 55 year old male who presents with upper respiratory symptoms that are consistent with    ASSESSMENT:   Encounter Diagnoses   Name Primary?    Bronchitis Yes    Environmental allergies        PLAN: Meds as below.  Comfort care as described in Patient Instructions    Patient with dry cough. Patient is afebrile, no respiratory distress, lungs clear to auscultation bilaterally likely bronchitis. Started on singulair and flonase for allergies. Also take medrol dospak for possible bronchitis  F/u sooner if cough worsens or other alarming symptoms develop such as productive cough, fevers, chills, hematemesis, chest pain, sob, wheezing.         Meds & Refills for this Visit:  Requested Prescriptions     Signed Prescriptions Disp Refills    methylPREDNISolone (MEDROL) 4 MG Oral Tablet Therapy Pack 21 each 0     Sig: As directed.    montelukast (SINGULAIR) 10 MG Oral Tab 30 tablet 0     Sig: Take 1 tablet (10 mg total) by mouth daily.    fluticasone propionate 50 MCG/ACT Nasal Suspension 11.1 mL 0     Si sprays by Nasal route daily.       Risks, benefits, and side effects of medication explained and discussed.    The patient indicates understanding of these issues and agrees to the plan.  The patient is asked to f/u with PCP if sx's persist or worsen.    There are no Patient Instructions on file for this visit.               [1]   Current Outpatient Medications   Medication Sig Dispense Refill    methylPREDNISolone (MEDROL) 4 MG Oral Tablet Therapy Pack As directed. 21 each 0    montelukast (SINGULAIR) 10 MG Oral Tab Take 1 tablet (10 mg total) by mouth daily. 30 tablet 0    fluticasone propionate 50  MCG/ACT Nasal Suspension 2 sprays by Nasal route daily. 11.1 mL 0    PEG 3350-KCl-Na Bicarb-NaCl 420 g Oral Recon Soln Take as directed by physician 4000 mL 0    aspirin 325 MG Oral Tab Take 1 tablet (325 mg total) by mouth daily.      amLODIPine 5 MG Oral Tab Take 1 tablet (5 mg total) by mouth nightly.      metoprolol succinate ER 25 MG Oral Tablet 24 Hr Take 1 tablet (25 mg total) by mouth daily.      losartan 25 MG Oral Tab Take 1 tablet (25 mg total) by mouth daily.     [2]   Past Medical History:   Asthma (HCC)   [3]   Past Surgical History:  Procedure Laterality Date    Colonoscopy  11/20/2020    [facility]    Hernia surgery     [4]   Family History  Problem Relation Age of Onset    Hypertension Mother     Cancer Father         Lung Ca   [5]   Social History  Socioeconomic History    Marital status:    Tobacco Use    Smoking status: Never     Passive exposure: Never    Smokeless tobacco: Never    Tobacco comments:     Updated 10/14/24   Vaping Use    Vaping status: Never Used   Substance and Sexual Activity    Alcohol use: No     Alcohol/week: 0.0 standard drinks of alcohol    Drug use: Never     Social Drivers of Health      Received from [facility]    Housing Stability

## 2025-05-01 NOTE — TELEPHONE ENCOUNTER
Pateint's wife is calling back to speak with a nurse.  Spouse is very sick.    Cough, fever, chills.  Please call PH. [phone]

## 2025-05-01 NOTE — TELEPHONE ENCOUNTER
Patient's wife called and said she is very nervous for her  and he needs to be seen today.  She would like someone to call her ASAP.  She also wants him seen today and if he can't be seen today she wants to know why he has to have a PCP?

## 2025-05-01 NOTE — TELEPHONE ENCOUNTER
Spoke to wife of patient- Catalina.   [facility] patient was diagnosed with flu on 4/5/25 at [facility].   [facility] patient has had non productive dry cough, headache, fever, body aches since then that have not improved.  [facility] patient came home from work yesterday with chills, dry cough, headache. Gave him tylenol.   States fever did not improve.   Does not have thermometer at home so unable to provide temperature of fever.   Denies shortness of breath, chest pain.     Wife states \"patient is not better from flu because he saw NP at [facility].\" Wife is upset that she called office 3x this morning and does not know why patient needs a PCP then.

## 2025-05-01 NOTE — TELEPHONE ENCOUNTER
Spoke to Dr. Vogt. Ok to double book today.     Please call patient and schedule at 1:30 PM today with Dr. Vogt

## 2025-05-27 ENCOUNTER — TELEPHONE (OUTPATIENT)
Dept: FAMILY MEDICINE CLINIC | Facility: CLINIC | Age: 55
End: 2025-05-27

## 2025-05-27 NOTE — TELEPHONE ENCOUNTER
Patient has been getting up frequently during the night to urinate. No pain and no blood.  Patient would like sooner appointment if available.  Future Appointments   Date Time Provider Department Center   6/16/2025  1:30 PM Traci Vogt,  [facility] [address] [facility] [address]

## 2025-06-16 ENCOUNTER — OFFICE VISIT (OUTPATIENT)
Dept: FAMILY MEDICINE CLINIC | Facility: CLINIC | Age: 55
End: 2025-06-16
Payer: COMMERCIAL

## 2025-06-16 VITALS
HEART RATE: 73 BPM | SYSTOLIC BLOOD PRESSURE: 98 MMHG | HEIGHT: 69 IN | RESPIRATION RATE: 16 BRPM | DIASTOLIC BLOOD PRESSURE: 62 MMHG | TEMPERATURE: 98 F | WEIGHT: 173 LBS | BODY MASS INDEX: 25.62 KG/M2 | OXYGEN SATURATION: 98 %

## 2025-06-16 DIAGNOSIS — Z12.11 SCREENING FOR COLON CANCER: ICD-10-CM

## 2025-06-16 DIAGNOSIS — R35.0 BENIGN PROSTATIC HYPERPLASIA WITH URINARY FREQUENCY: ICD-10-CM

## 2025-06-16 DIAGNOSIS — M79.89 SWELLING OF DIGIT OF RIGHT HAND: Primary | ICD-10-CM

## 2025-06-16 DIAGNOSIS — N40.1 BENIGN PROSTATIC HYPERPLASIA WITH URINARY FREQUENCY: ICD-10-CM

## 2025-06-16 PROCEDURE — 99213 OFFICE O/P EST LOW 20 MIN: CPT | Performed by: FAMILY MEDICINE

## 2025-06-16 RX ORDER — ASPIRIN 81 MG/1
81 TABLET ORAL DAILY
COMMUNITY

## 2025-06-16 NOTE — PROGRESS NOTES
Subjective:   Sukhdeep Morris is a 55 year old male who presents for Urinary Frequency (Patient c/o frequent urine - /Swelling of pinky knuckle on right hand x one wek  )       History/Other:   History of Present Illness  Sukhdeep Morris is a 55 year old male who presents with nocturia and swelling of the finger. He is accompanied by his wife.    He has experienced nocturia for almost a year, waking up two to three times per night to urinate. There is no associated pain, hematuria, or daytime urinary frequency. Urination is normal during the day, and he does not consume much water. There is no family history of prostate issues, and he has no personal history of prostatitis or urinary tract infections. His last PSA test in 2024 was normal. He consumes coffee and tea late at night.    He reports swelling in his finger that began a week ago after cutting grass. The pain is at the base of his right hand pinky.  He suspects an insect bite but did not observe any bite marks or redness. Initial pain has subsided, but swelling persists despite using Benadryl cream and ice packs. No history of gout, fever, or chills.    He mentions a history of knee pain for which he received an injection a month ago. The injection provided temporary relief, but the pain returned. He has tried physical therapy and uses a cream that provides some relief. He has not had any fluid drained from the knee.    He takes prednisone, aspirin, amlodipine, and losartan for his blood pressure, which is currently well-controlled. 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 No fever, chills, shortness of breath, chest pain, cough, nausea, vomiting, or blood in urine.   Chief Complaint Reviewed and Verified  Nursing Notes Reviewed and   Verified  Tobacco Reviewed  Allergies Reviewed  Medications Reviewed    Medical History Reviewed  Surgical History Reviewed  Family History   Reviewed  Social History  Reviewed         Tobacco:  He has never smoked tobacco.    Current Medications[1]           Review of Systems:  Pertinent items are noted in HPI.      Objective:   BP 98/62   Pulse 73   Temp 97.6 °F (36.4 °C) (Temporal)   Resp 16   Ht 5' 9\" (1.753 m)   Wt 173 lb (78.5 kg)   SpO2 98%   BMI 25.55 kg/m²  Estimated body mass index is 25.55 kg/m² as calculated from the following:    Height as of this encounter: 5' 9\" (1.753 m).    Weight as of this encounter: 173 lb (78.5 kg).  Results  LABS  PSA: normal (2024)    RADIOLOGY  MRI: Arthritis     Physical Exam  GENERAL: Alert, cooperative, well developed, no acute distress  CHEST: Clear to auscultation bilaterally, no wheezes, rhonchi, or crackles  CARDIOVASCULAR: Normal heart rate and rhythm, S1 and S2 normal without murmurs  ABDOMEN: Soft, non-tender, non-distended, without organomegaly, normal bowel sounds  NEUROLOGICAL: Cranial nerves grossly intact, moves all extremities without gross motor or sensory deficit    Physical Exam  Musculoskeletal:        Hands:       Comments: Swelling at the head of fifth MCP            Assessment & Plan:   1. Swelling of digit of right hand (Primary)  2. Benign prostatic hyperplasia with urinary frequency  3. Screening for colon cancer    Assessment & Plan  Nocturia  Intermittent nocturia for one year, likely due to late-night caffeine consumption. Normal PSA reduces prostate cancer likelihood. Possible BPH or lifestyle factors considered.  - Advise cessation of coffee and tea after 7 PM.  - Consider further evaluation for BPH if nocturia persists    Finger Swelling  Swelling post-yard work, non-allergic and non-infectious. Possible peripheral edema or ligament sprain.  - Recommend Tylenol for pain.  - Advise brace use and ice application.    Musculoskeletal Chest Pain  Intermittent chest discomfort likely musculoskeletal, related to occupational activities. No concerning associated symptoms  - Seek further evaluation if symptoms  persist or worsen.    General Health Maintenance  Colonoscopy needed, complicated by aspirin use.  - Plan colonoscopy in [place] country where aspirin use can be managed.    Follow-up  Plans to move to [facility], no immediate follow-up needed locally.  - Ensure adequate medication supply before moving.  - Follow up with healthcare providers in [facility].        No follow-ups on file.        Traci Vogt DO, 6/15/2025, 10:45 PM           The following individual(s) verbally consented to be recorded using ambient AI listening technology and understand that they can each withdraw their consent to this listening technology at any point by asking the clinician to turn off or pause the recording:    Patient name: Sukhdeep Morris    Traci Vogt DO        This note was prepared using Dragon Medical voice recognition dictation software. As a result errors may occur. When identified these errors have been corrected. While every attempt is made to correct errors during dictation discrepancies may still exist.      Note to patient: The 21st Century Cures Act makes medical notes like these available to patients in the interest of transparency. However, be advised this is a medical document. It is intended as peer to peer communication. It is written in medical language and may contain abbreviations or verbiage that are unfamiliar. It may appear blunt or direct. Medical documents are intended to carry relevant information, facts as evident, and the clinical opinion of the practitioner.               [1]   Current Outpatient Medications   Medication Sig Dispense Refill    aspirin 81 MG Oral Tab EC Take 1 tablet (81 mg total) by mouth daily.      PEG 3350-KCl-Na Bicarb-NaCl 420 g Oral Recon Soln Take as directed by physician 4000 mL 0    amLODIPine 5 MG Oral Tab Take 1 tablet (5 mg total) by mouth nightly.      losartan 25 MG Oral Tab Take 1 tablet (25 mg total) by mouth daily.

## (undated) NOTE — LETTER
Date: 4/5/2025    Patient Name: Sukhdeep Morris          To Whom it may concern:    This letter has been written at the patient's request. The above patient was seen at [facility] for treatment of a medical condition.    This patient should be excused from attending work/school from until fever free for 24 hours.         Sincerely,    Rosita Gan PA-C